# Patient Record
Sex: FEMALE | Race: WHITE
[De-identification: names, ages, dates, MRNs, and addresses within clinical notes are randomized per-mention and may not be internally consistent; named-entity substitution may affect disease eponyms.]

---

## 2017-02-09 ENCOUNTER — HOSPITAL ENCOUNTER (EMERGENCY)
Dept: HOSPITAL 47 - EC | Age: 42
Discharge: HOME | End: 2017-02-09
Payer: MEDICAID

## 2017-02-09 VITALS — RESPIRATION RATE: 16 BRPM

## 2017-02-09 VITALS — HEART RATE: 69 BPM | SYSTOLIC BLOOD PRESSURE: 115 MMHG | DIASTOLIC BLOOD PRESSURE: 70 MMHG | TEMPERATURE: 97.5 F

## 2017-02-09 DIAGNOSIS — M25.511: ICD-10-CM

## 2017-02-09 DIAGNOSIS — R07.9: Primary | ICD-10-CM

## 2017-02-09 DIAGNOSIS — F17.200: ICD-10-CM

## 2017-02-09 DIAGNOSIS — R53.1: ICD-10-CM

## 2017-02-09 DIAGNOSIS — R00.1: ICD-10-CM

## 2017-02-09 DIAGNOSIS — Z79.899: ICD-10-CM

## 2017-02-09 DIAGNOSIS — Z87.01: ICD-10-CM

## 2017-02-09 LAB
ALP SERPL-CCNC: 69 U/L (ref 38–126)
ALT SERPL-CCNC: 38 U/L (ref 9–52)
ANION GAP SERPL CALC-SCNC: 10 MMOL/L
APTT BLD: 24.5 SEC (ref 22–30)
AST SERPL-CCNC: 20 U/L (ref 14–36)
BASOPHILS # BLD AUTO: 0.1 K/UL (ref 0–0.2)
BASOPHILS NFR BLD AUTO: 1 %
BUN SERPL-SCNC: 17 MG/DL (ref 7–17)
CALCIUM SPEC-MCNC: 9.6 MG/DL (ref 8.4–10.2)
CH: 30.1
CHCM: 33.8
CHLORIDE SERPL-SCNC: 105 MMOL/L (ref 98–107)
CK SERPL-CCNC: 58 U/L (ref 30–135)
CO2 SERPL-SCNC: 27 MMOL/L (ref 22–30)
EOSINOPHIL # BLD AUTO: 0.3 K/UL (ref 0–0.7)
EOSINOPHIL NFR BLD AUTO: 4 %
ERYTHROCYTE [DISTWIDTH] IN BLOOD BY AUTOMATED COUNT: 4.55 M/UL (ref 3.8–5.4)
ERYTHROCYTE [DISTWIDTH] IN BLOOD: 13 % (ref 11.5–15.5)
GLUCOSE SERPL-MCNC: 104 MG/DL (ref 74–99)
HCT VFR BLD AUTO: 40.7 % (ref 34–46)
HDW: 2.43
HGB BLD-MCNC: 13.7 GM/DL (ref 11.4–16)
INR PPP: 1 (ref ?–1.1)
LUC NFR BLD AUTO: 4 %
LYMPHOCYTES # SPEC AUTO: 2.8 K/UL (ref 1–4.8)
LYMPHOCYTES NFR SPEC AUTO: 35 %
MAGNESIUM SPEC-SCNC: 1.7 MG/DL (ref 1.6–2.3)
MCH RBC QN AUTO: 30 PG (ref 25–35)
MCHC RBC AUTO-ENTMCNC: 33.5 G/DL (ref 31–37)
MCV RBC AUTO: 89.5 FL (ref 80–100)
MONOCYTES # BLD AUTO: 0.4 K/UL (ref 0–1)
MONOCYTES NFR BLD AUTO: 5 %
NEUTROPHILS # BLD AUTO: 4.2 K/UL (ref 1.3–7.7)
NEUTROPHILS NFR BLD AUTO: 51 %
NON-AFRICAN AMERICAN GFR(MDRD): >60
PHOSPHATE SERPL-MCNC: 4.4 MG/DL (ref 2.5–4.5)
POTASSIUM SERPL-SCNC: 3.8 MMOL/L (ref 3.5–5.1)
PROT SERPL-MCNC: 7.1 G/DL (ref 6.3–8.2)
PT BLD: 10.3 SEC (ref 9–12)
SODIUM SERPL-SCNC: 142 MMOL/L (ref 137–145)
TROPONIN I SERPL-MCNC: <0.012 NG/ML (ref 0–0.03)
WBC # BLD AUTO: 0.34 10*3/UL
WBC # BLD AUTO: 8.1 K/UL (ref 3.8–10.6)
WBC (PEROX): 8.37

## 2017-02-09 PROCEDURE — 83735 ASSAY OF MAGNESIUM: CPT

## 2017-02-09 PROCEDURE — 85730 THROMBOPLASTIN TIME PARTIAL: CPT

## 2017-02-09 PROCEDURE — 96374 THER/PROPH/DIAG INJ IV PUSH: CPT

## 2017-02-09 PROCEDURE — 85610 PROTHROMBIN TIME: CPT

## 2017-02-09 PROCEDURE — 82550 ASSAY OF CK (CPK): CPT

## 2017-02-09 PROCEDURE — 99285 EMERGENCY DEPT VISIT HI MDM: CPT

## 2017-02-09 PROCEDURE — 84484 ASSAY OF TROPONIN QUANT: CPT

## 2017-02-09 PROCEDURE — 84443 ASSAY THYROID STIM HORMONE: CPT

## 2017-02-09 PROCEDURE — 36415 COLL VENOUS BLD VENIPUNCTURE: CPT

## 2017-02-09 PROCEDURE — 84100 ASSAY OF PHOSPHORUS: CPT

## 2017-02-09 PROCEDURE — 96361 HYDRATE IV INFUSION ADD-ON: CPT

## 2017-02-09 PROCEDURE — 85025 COMPLETE CBC W/AUTO DIFF WBC: CPT

## 2017-02-09 PROCEDURE — 85379 FIBRIN DEGRADATION QUANT: CPT

## 2017-02-09 PROCEDURE — 87502 INFLUENZA DNA AMP PROBE: CPT

## 2017-02-09 PROCEDURE — 83690 ASSAY OF LIPASE: CPT

## 2017-02-09 PROCEDURE — 80053 COMPREHEN METABOLIC PANEL: CPT

## 2017-02-09 PROCEDURE — 80320 DRUG SCREEN QUANTALCOHOLS: CPT

## 2017-02-09 PROCEDURE — 93005 ELECTROCARDIOGRAM TRACING: CPT

## 2017-02-09 PROCEDURE — 82553 CREATINE MB FRACTION: CPT

## 2017-02-09 PROCEDURE — 71020: CPT

## 2017-02-09 NOTE — XR
EXAMINATION TYPE: XR chest 2V

 

DATE OF EXAM: 2/9/2017 10:23 PM

 

COMPARISON: NONE

 

HISTORY: Right-sided chest pain

 

TECHNIQUE:  Frontal and lateral views of the chest are obtained.

 

FINDINGS:  Heart and mediastinum are normal. Lungs are clear. Diaphragm is normal. Bony thorax is int
act. There are chest leads.

 

IMPRESSION:  Normal chest

## 2017-02-09 NOTE — ED
General Adult HPI





- General


Chief complaint: Chest Pain


Stated complaint: Chest Pain


Time Seen by Provider: 02/09/17 21:10


Source: patient, RN notes reviewed, old records reviewed


Mode of arrival: ambulatory


Limitations: no limitations





- History of Present Illness


Initial comments: 





This is a 41-year-old female ER for evaluation in general not feeling well, 

patient also states she noticed her pulse was low.  Patient has no significant 

medical history unremarkable pressure-like cholesterol no diabetes occasional 

smoker.  Patient has no prior history of chronic ER for any issues.  Patient 

does note that she has maybe a valve issue.  Patient states she still does not 

feel well, denies any chest pain or short of breath, does admit to some right 

shoulder and right pectoral pain with deep breath.  It or moving her right arm.

  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  No fevers, 

she did travel [place] in December.  No pain or swelling of lower extremities





- Related Data


 Home Medications











 Medication  Instructions  Recorded  Confirmed


 


Dextroamphetamine/Amphetamine 10 mg PO DAILY 02/09/17 02/09/17





[Adderall]   











 Allergies











Allergy/AdvReac Type Severity Reaction Status Date / Time


 


No Known Allergies Allergy   Verified 02/09/17 21:16














Review of Systems


ROS Statement: 


Those systems with pertinent positive or pertinent negative responses have been 

documented in the HPI.





ROS Other: All systems not noted in ROS Statement are negative.





Past Medical History


Past Medical History: Pneumonia


History of Any Multi-Drug Resistant Organisms: None Reported


Past Surgical History: Appendectomy


Past Psychological History: No Psychological Hx Reported


Smoking Status: Current every day smoker


Past Alcohol Use History: Occasional


Past Drug Use History: Marijuana





General Exam


Limitations: no limitations


General appearance: alert, in no apparent distress, anxious


Head exam: Present: atraumatic, normocephalic, normal inspection


Eye exam: Present: normal appearance, PERRL, EOMI.  Absent: scleral icterus, 

conjunctival injection, periorbital swelling


ENT exam: Present: normal exam, mucous membranes moist


Neck exam: Present: normal inspection.  Absent: tenderness, meningismus, 

lymphadenopathy


Respiratory exam: Present: normal lung sounds bilaterally.  Absent: respiratory 

distress, wheezes, rales, rhonchi, stridor


Cardiovascular Exam: Present: regular rate, normal rhythm, normal heart sounds.

  Absent: systolic murmur, diastolic murmur, rubs, gallop, clicks


GI/Abdominal exam: Present: soft, normal bowel sounds.  Absent: distended, 

tenderness, guarding, rebound, rigid


Extremities exam: Present: normal inspection, full ROM, normal capillary 

refill.  Absent: tenderness, pedal edema, joint swelling, calf tenderness


Back exam: Present: normal inspection


Neurological exam: Present: alert, oriented X3, CN II-XII intact


Psychiatric exam: Present: normal affect, normal mood


Skin exam: Present: warm, dry, intact, normal color.  Absent: rash





Course


 Vital Signs











  02/09/17 02/09/17





  20:55 22:17


 


Temperature 98.8 F 97.8 F


 


Pulse Rate 44 L 70


 


Respiratory 18 16





Rate  


 


Blood Pressure 150/81 110/74


 


O2 Sat by Pulse 96 96





Oximetry  














EKG Findings





- EKG Comments:


EKG Findings:: EKG shows normal sinus rhythm rate of 68, RI 18, QRS 76, 





Medical Decision Making





- Medical Decision Making





41 female here with nonspecific symptoms, not feeling well, anxiety, patient 

did have a lower pulse but no episodes of syncope no passing out, patient's 

pulse was normal here in the emergency room, EKG is normal lab work is normal 

patient can be discharged





- Lab Data


Result diagrams: 


 02/09/17 21:36





 02/09/17 21:36


 Lab Results











  02/09/17 02/09/17 02/09/17 Range/Units





  21:36 21:36 21:36 


 


WBC   8.1   (3.8-10.6)  k/uL


 


RBC   4.55   (3.80-5.40)  m/uL


 


Hgb   13.7   (11.4-16.0)  gm/dL


 


Hct   40.7   (34.0-46.0)  %


 


MCV   89.5   (80.0-100.0)  fL


 


MCH   30.0   (25.0-35.0)  pg


 


MCHC   33.5   (31.0-37.0)  g/dL


 


RDW   13.0   (11.5-15.5)  %


 


Plt Count   234   (150-450)  k/uL


 


Neutrophils %   51   %


 


Lymphocytes %   35   %


 


Monocytes %   5   %


 


Eosinophils %   4   %


 


Basophils %   1   %


 


Neutrophils #   4.2   (1.3-7.7)  k/uL


 


Lymphocytes #   2.8   (1.0-4.8)  k/uL


 


Monocytes #   0.4   (0-1.0)  k/uL


 


Eosinophils #   0.3   (0-0.7)  k/uL


 


Basophils #   0.1   (0-0.2)  k/uL


 


PT     (9.0-12.0)  sec


 


INR     (<1.1)  


 


APTT     (22.0-30.0)  sec


 


D-Dimer     (<0.60)  mg/L FEU


 


Sodium    142  (137-145)  mmol/L


 


Potassium    3.8  (3.5-5.1)  mmol/L


 


Chloride    105  ()  mmol/L


 


Carbon Dioxide    27  (22-30)  mmol/L


 


Anion Gap    10  mmol/L


 


BUN    17  (7-17)  mg/dL


 


Creatinine    0.67  (0.52-1.04)  mg/dL


 


Est GFR (MDRD) Af Amer    >60  (>60 ml/min/1.73 sqM)  


 


Est GFR (MDRD) Non-Af    >60  (>60 ml/min/1.73 sqM)  


 


Glucose    104 H  (74-99)  mg/dL


 


Calcium    9.6  (8.4-10.2)  mg/dL


 


Phosphorus    4.4  (2.5-4.5)  mg/dL


 


Magnesium    1.7  (1.6-2.3)  mg/dL


 


Total Bilirubin    0.5  (0.2-1.3)  mg/dL


 


AST    20  (14-36)  U/L


 


ALT    38  (9-52)  U/L


 


Alkaline Phosphatase    69  ()  U/L


 


Total Creatine Kinase  58    ()  U/L


 


CK-MB (CK-2)  0.7    (0.0-2.4)  ng/mL


 


CK-MB (CK-2) Rel Index  1.2    


 


Troponin I  <0.012    (0.000-0.034)  ng/mL


 


Total Protein    7.1  (6.3-8.2)  g/dL


 


Albumin    4.3  (3.5-5.0)  g/dL


 


Lipase    95  ()  U/L


 


TSH    3.790  (0.465-4.680)  mIU/L


 


Serum Alcohol    <10  mg/dL


 


Influenza Type A RNA     (Not Detectd)  


 


Influenza Type B (PCR)     (Not Detectd)  














  02/09/17 02/09/17 Range/Units





  21:36 21:36 


 


WBC    (3.8-10.6)  k/uL


 


RBC    (3.80-5.40)  m/uL


 


Hgb    (11.4-16.0)  gm/dL


 


Hct    (34.0-46.0)  %


 


MCV    (80.0-100.0)  fL


 


MCH    (25.0-35.0)  pg


 


MCHC    (31.0-37.0)  g/dL


 


RDW    (11.5-15.5)  %


 


Plt Count    (150-450)  k/uL


 


Neutrophils %    %


 


Lymphocytes %    %


 


Monocytes %    %


 


Eosinophils %    %


 


Basophils %    %


 


Neutrophils #    (1.3-7.7)  k/uL


 


Lymphocytes #    (1.0-4.8)  k/uL


 


Monocytes #    (0-1.0)  k/uL


 


Eosinophils #    (0-0.7)  k/uL


 


Basophils #    (0-0.2)  k/uL


 


PT  10.3   (9.0-12.0)  sec


 


INR  1.0   (<1.1)  


 


APTT  24.5   (22.0-30.0)  sec


 


D-Dimer  0.26   (<0.60)  mg/L FEU


 


Sodium    (137-145)  mmol/L


 


Potassium    (3.5-5.1)  mmol/L


 


Chloride    ()  mmol/L


 


Carbon Dioxide    (22-30)  mmol/L


 


Anion Gap    mmol/L


 


BUN    (7-17)  mg/dL


 


Creatinine    (0.52-1.04)  mg/dL


 


Est GFR (MDRD) Af Amer    (>60 ml/min/1.73 sqM)  


 


Est GFR (MDRD) Non-Af    (>60 ml/min/1.73 sqM)  


 


Glucose    (74-99)  mg/dL


 


Calcium    (8.4-10.2)  mg/dL


 


Phosphorus    (2.5-4.5)  mg/dL


 


Magnesium    (1.6-2.3)  mg/dL


 


Total Bilirubin    (0.2-1.3)  mg/dL


 


AST    (14-36)  U/L


 


ALT    (9-52)  U/L


 


Alkaline Phosphatase    ()  U/L


 


Total Creatine Kinase    ()  U/L


 


CK-MB (CK-2)    (0.0-2.4)  ng/mL


 


CK-MB (CK-2) Rel Index    


 


Troponin I    (0.000-0.034)  ng/mL


 


Total Protein    (6.3-8.2)  g/dL


 


Albumin    (3.5-5.0)  g/dL


 


Lipase    ()  U/L


 


TSH    (0.465-4.680)  mIU/L


 


Serum Alcohol    mg/dL


 


Influenza Type A RNA   Not Detected  (Not Detectd)  


 


Influenza Type B (PCR)   Not Detected  (Not Detectd)  














- Radiology Data


Radiology results: report reviewed (Chest x-ray two-view is negative for acute 

disease), image reviewed





Disposition


Clinical Impression: 


 Chest pain, Weakness, Bradycardia





Disposition: HOME SELF-CARE


Condition: Good


Instructions:  Bradycardia (ED)


Referrals: 


Stephanie Correia MD [Primary Care Provider] - 1-2 days

## 2018-02-01 ENCOUNTER — HOSPITAL ENCOUNTER (OUTPATIENT)
Dept: HOSPITAL 47 - MMGSC | Age: 43
Discharge: HOME | End: 2018-02-01
Payer: COMMERCIAL

## 2018-02-01 DIAGNOSIS — R63.5: ICD-10-CM

## 2018-02-01 DIAGNOSIS — Z00.00: Primary | ICD-10-CM

## 2018-02-01 LAB
ALBUMIN SERPL-MCNC: 3.9 G/DL (ref 3.5–5)
ALP SERPL-CCNC: 68 U/L (ref 38–126)
ALT SERPL-CCNC: 32 U/L (ref 9–52)
ANION GAP SERPL CALC-SCNC: 9 MMOL/L
AST SERPL-CCNC: 26 U/L (ref 14–36)
BUN SERPL-SCNC: 18 MG/DL (ref 7–17)
CALCIUM SPEC-MCNC: 9.4 MG/DL (ref 8.4–10.2)
CHLORIDE SERPL-SCNC: 104 MMOL/L (ref 98–107)
CHOLEST SERPL-MCNC: 155 MG/DL (ref ?–200)
CO2 SERPL-SCNC: 28 MMOL/L (ref 22–30)
GLUCOSE SERPL-MCNC: 104 MG/DL (ref 74–99)
HDLC SERPL-MCNC: 60 MG/DL (ref 40–60)
LDLC SERPL CALC-MCNC: 77 MG/DL (ref 0–99)
POTASSIUM SERPL-SCNC: 4.2 MMOL/L (ref 3.5–5.1)
PROT SERPL-MCNC: 6.7 G/DL (ref 6.3–8.2)
SODIUM SERPL-SCNC: 141 MMOL/L (ref 137–145)
T4 FREE SERPL-MCNC: 1.31 NG/DL (ref 0.78–2.19)
TRIGL SERPL-MCNC: 88 MG/DL (ref ?–150)

## 2018-02-01 PROCEDURE — 80053 COMPREHEN METABOLIC PANEL: CPT

## 2018-02-01 PROCEDURE — 84439 ASSAY OF FREE THYROXINE: CPT

## 2018-02-01 PROCEDURE — 36415 COLL VENOUS BLD VENIPUNCTURE: CPT

## 2018-02-01 PROCEDURE — 84443 ASSAY THYROID STIM HORMONE: CPT

## 2018-02-01 PROCEDURE — 80061 LIPID PANEL: CPT

## 2018-09-28 ENCOUNTER — HOSPITAL ENCOUNTER (OUTPATIENT)
Dept: HOSPITAL 47 - RADUSWWP | Age: 43
End: 2018-09-28
Attending: FAMILY MEDICINE
Payer: COMMERCIAL

## 2018-09-28 DIAGNOSIS — R10.84: ICD-10-CM

## 2018-09-28 DIAGNOSIS — Z12.31: Primary | ICD-10-CM

## 2018-09-28 DIAGNOSIS — D25.9: ICD-10-CM

## 2018-09-28 PROCEDURE — 76856 US EXAM PELVIC COMPLETE: CPT

## 2018-09-28 PROCEDURE — 77067 SCR MAMMO BI INCL CAD: CPT

## 2018-09-28 PROCEDURE — 76700 US EXAM ABDOM COMPLETE: CPT

## 2018-09-28 NOTE — US
EXAMINATION TYPE: US pelvic complete

 

DATE OF EXAM: 9/28/2018

 

COMPARISON: US 11/18/2013

 

CLINICAL HISTORY: R10.84 ABDOMINAL PAIN. Pt states ABD and RLQ pain

 

TECHNIQUE:  Transabdominal (TA).  Transabdominal sonographic images of the pelvis were acquired. 

 

Date of LMP:  Pt states 6 years ago, G 0, P 0

 

EXAM MEASUREMENTS:

 

Uterus:  5.5 x 2.5 x 3.1 cm

Endometrial Stripe: 0.5 cm

Right Ovary:  1.7 x 0.9 x 1.3 cm

Left Ovary:  2.1 x 1.3 x 1.7 cm

 

 

 

1. Uterus:  Anteverted   Stable fibroid as seen on previous= 2.7 x 2.3 x 2.6 cm

2. Endometrium:  wnl

3. Right Ovary:  wnl

4. Left Ovary:  wnl

5. Bilateral Adnexa:  wnl

6. Posterior cul-de-sac:  wnl

 

 

 

IMPRESSION: 

1. Uterine fibroid, stable from comparison

## 2018-09-28 NOTE — MM
Reason for exam: screening  (asymptomatic).

Baseline mammogram.



History:

Patient is postmenopausal.



Physical Findings:

Nurse did not find any significant physical abnormalities on exam.



MG Screening Mammo w CAD

Bilateral CC and MLO view(s) were taken.

The breast tissue is extremely dense which could obscure a lesion on mammography. 

There is no discrete abnormality.



These results were verbally communicated with the patient and result sheet given 

to the patient on 9/28/18.





ASSESSMENT: Benign, BI-RAD 2



RECOMMENDATION:

Routine screening mammogram of both breasts in 1 year.

## 2018-09-28 NOTE — US
EXAMINATION TYPE: US abdomen complete

 

DATE OF EXAM: 9/28/2018

 

COMPARISON: NONE

 

CLINICAL HISTORY: R94.5 Abnormal Liver function. Abn LFT's, generalized ABD pain

 

EXAM MEASUREMENTS:

 

Liver Length:  14.2 cm   

Gallbladder Wall:  0.2 cm   

CBD:  0.4 cm

Spleen:  9.2 cm   

Right Kidney:  12.7 x 6.0 x 5.6 cm 

Left Kidney:  11.1 x 6.5 x 4.7 cm   

 

 

 

Pancreas:  Obscured by bowel gas

Liver:  wnl  

Gallbladder:  wnl

**Evidence for sonographic Crouch's sign:  No

CBD:  wnl 

Spleen:  wnl   

Right Kidney:  wnl   

Left Kidney:  wnl   

Upper IVC:  wnl  

Abd Aorta:  wnl

 

IMPRESSION: 

1. Normal abdomen ultrasound

## 2019-07-06 ENCOUNTER — HOSPITAL ENCOUNTER (EMERGENCY)
Dept: HOSPITAL 47 - EC | Age: 44
Discharge: HOME | End: 2019-07-06
Payer: COMMERCIAL

## 2019-07-06 VITALS
HEART RATE: 76 BPM | SYSTOLIC BLOOD PRESSURE: 136 MMHG | DIASTOLIC BLOOD PRESSURE: 82 MMHG | RESPIRATION RATE: 16 BRPM | TEMPERATURE: 97.9 F

## 2019-07-06 DIAGNOSIS — R31.9: ICD-10-CM

## 2019-07-06 DIAGNOSIS — Z87.19: ICD-10-CM

## 2019-07-06 DIAGNOSIS — Z90.49: ICD-10-CM

## 2019-07-06 DIAGNOSIS — K57.92: Primary | ICD-10-CM

## 2019-07-06 DIAGNOSIS — F17.200: ICD-10-CM

## 2019-07-06 LAB
ALBUMIN SERPL-MCNC: 4.4 G/DL (ref 3.5–5)
ALP SERPL-CCNC: 73 U/L (ref 38–126)
ALT SERPL-CCNC: 18 U/L (ref 9–52)
ANION GAP SERPL CALC-SCNC: 9 MMOL/L
AST SERPL-CCNC: 14 U/L (ref 14–36)
BASOPHILS # BLD AUTO: 0.1 K/UL (ref 0–0.2)
BASOPHILS NFR BLD AUTO: 1 %
BUN SERPL-SCNC: 17 MG/DL (ref 7–17)
CALCIUM SPEC-MCNC: 9 MG/DL (ref 8.4–10.2)
CHLORIDE SERPL-SCNC: 103 MMOL/L (ref 98–107)
CO2 SERPL-SCNC: 26 MMOL/L (ref 22–30)
EOSINOPHIL # BLD AUTO: 0.2 K/UL (ref 0–0.7)
EOSINOPHIL NFR BLD AUTO: 2 %
ERYTHROCYTE [DISTWIDTH] IN BLOOD BY AUTOMATED COUNT: 4.36 M/UL (ref 3.8–5.4)
ERYTHROCYTE [DISTWIDTH] IN BLOOD: 14.3 % (ref 11.5–15.5)
GLUCOSE SERPL-MCNC: 101 MG/DL (ref 74–99)
HCT VFR BLD AUTO: 38.3 % (ref 34–46)
HGB BLD-MCNC: 12.7 GM/DL (ref 11.4–16)
LYMPHOCYTES # SPEC AUTO: 2.6 K/UL (ref 1–4.8)
LYMPHOCYTES NFR SPEC AUTO: 26 %
MCH RBC QN AUTO: 29.2 PG (ref 25–35)
MCHC RBC AUTO-ENTMCNC: 33.2 G/DL (ref 31–37)
MCV RBC AUTO: 88 FL (ref 80–100)
MONOCYTES # BLD AUTO: 0.5 K/UL (ref 0–1)
MONOCYTES NFR BLD AUTO: 5 %
NEUTROPHILS # BLD AUTO: 6.3 K/UL (ref 1.3–7.7)
NEUTROPHILS NFR BLD AUTO: 64 %
PH UR: 5.5 [PH] (ref 5–8)
PLATELET # BLD AUTO: 228 K/UL (ref 150–450)
POTASSIUM SERPL-SCNC: 3.8 MMOL/L (ref 3.5–5.1)
PROT SERPL-MCNC: 7.1 G/DL (ref 6.3–8.2)
RBC UR QL: 2 /HPF (ref 0–5)
SODIUM SERPL-SCNC: 138 MMOL/L (ref 137–145)
SP GR UR: 1.02 (ref 1–1.03)
SQUAMOUS UR QL AUTO: <1 /HPF (ref 0–4)
UROBILINOGEN UR QL STRIP: <2 MG/DL (ref ?–2)
WBC # BLD AUTO: 9.9 K/UL (ref 3.8–10.6)
WBC #/AREA URNS HPF: 1 /HPF (ref 0–5)

## 2019-07-06 PROCEDURE — 85025 COMPLETE CBC W/AUTO DIFF WBC: CPT

## 2019-07-06 PROCEDURE — 36415 COLL VENOUS BLD VENIPUNCTURE: CPT

## 2019-07-06 PROCEDURE — 81001 URINALYSIS AUTO W/SCOPE: CPT

## 2019-07-06 PROCEDURE — 80053 COMPREHEN METABOLIC PANEL: CPT

## 2019-07-06 PROCEDURE — 74176 CT ABD & PELVIS W/O CONTRAST: CPT

## 2019-07-06 PROCEDURE — 81025 URINE PREGNANCY TEST: CPT

## 2019-07-06 PROCEDURE — 99284 EMERGENCY DEPT VISIT MOD MDM: CPT

## 2019-07-06 NOTE — ED
Abdominal Pain HPI





- General


Chief Complaint: Abdominal Pain


Stated Complaint: LLQ PAIN


Time Seen by Provider: 07/06/19 17:32


Source: patient


Mode of arrival: ambulatory


Limitations: no limitations





- History of Present Illness


Initial Comments: 


43-year-old female presenting for left lower quadrant abdominal pain.  Patient 

states she was supplemented express because she had blood in her urine.  Patient

provided prescription for ciprofloxacin and she has history of diverticulitis.  

Patient states it feels similar to when she had diverticulitis in the past.  

Patient states she is comfortable when sitting there however when she touches 

the area is tender to palpation.  Patient denies fevers.  She has vomiting or 

diarrhea.  She denies melena or hematochezia.  Remaining review of systems 

negative upon arrival patient appears well no signs of acute distress.








- Related Data


                                  Previous Rx's











 Medication  Instructions  Recorded


 


Amoxic-Pot Clav 875-125Mg 1 tab PO Q12HR 10 Days #20 tablet 07/06/19





[Augmentin 875-125]  











                                    Allergies











Allergy/AdvReac Type Severity Reaction Status Date / Time


 


No Known Allergies Allergy   Verified 07/06/19 18:24














Review of Systems


ROS Statement: 


Those systems with pertinent positive or pertinent negative responses have been 

documented in the HPI.





ROS Other: All systems not noted in ROS Statement are negative.





Past Medical History


Past Medical History: Pneumonia


Additional Past Medical History / Comment(s): diverticulosis, bradycardia


History of Any Multi-Drug Resistant Organisms: None Reported


Past Surgical History: Appendectomy


Past Psychological History: No Psychological Hx Reported


Smoking Status: Current every day smoker


Past Alcohol Use History: Occasional


Past Drug Use History: Marijuana





General Exam





- General Exam Comments


Initial Comments: 


General:  The patient is awake and alert, in no distress, and does not appear 

acutely ill. 


Eye:  Pupils are equal, round and reactive to light, extra-ocular movements are 

intact.  No nystagmus.  There is normal conjunctiva bilaterally.  No signs of 

icterus.  


Ears, nose, mouth and throat:  There are moist mucous membranes and no oral 

lesions. 


Neck:  The neck is supple, there is no tenderness or JVD.  


Cardiovascular:  There is a regular rate and rhythm. No murmur, rub or gallop is

appreciated.


Respiratory:  Lungs are clear to auscultation, respirations are non-labored, 

breath sounds are equal.  No wheezes, stridor, rales, or rhonchi.


Gastrointestinal:  Soft, non-distended, abdomen tender to palpation of the left 

lower quadrant without masses or organomegaly noted. There is no rebound or 

guarding present.  No CVA tenderness. Bowel sounds are unremarkable.


Musculoskeletal:  Normal ROM, no tenderness.  Strength 5/5. Sensation intact. 

Pulses equal bilaterally 2+.  


Neurological:  A&O x 3. CN II-XII intact, There are no obvious motor or sensory 

deficits. Coordination appears grossly intact. Speech is normal.


Skin:  Skin is warm and dry and no rashes or lesions are noted. 


Psychiatric:  Cooperative, appropriate mood & affect, normal judgment.  





Limitations: no limitations





Course


                                   Vital Signs











  07/06/19 07/06/19





  17:24 19:51


 


Temperature 98.7 F 97.9 F


 


Pulse Rate 73 76


 


Respiratory 18 16





Rate  


 


Blood Pressure 121/81 136/82


 


O2 Sat by Pulse 100 98





Oximetry  














Medical Decision Making





- Medical Decision Making





CT revealed findings of an uncomplicated diverticulitis.  No evidence of 

perforation.  No absence of abscess or phlegm and.  Patient is to take Augmentin

twice daily for the next 14 days instead of ciprofloxacin given the risk of 

tendon rupture and black box warning I feel Augmentin is a safer alternative.  

Patient does not appear peritoneal.  Patient states her pain is controlled.  

Patient afebrile.  No leukocytosis.  I did recommend patient have a endoscopy 

and she has not had one since the diagnosis of diverticulitis/ecchymosis.  

Patient is group And she prefers discharge at this time.  Return parameters as 

well as risk perforation were discussed with patient who verbalized 

understanding.  Patient was discharged.  Water discussed the case with attending

provider Dr. Sanchez/





- Lab Data


Result diagrams: 


                                 07/06/19 17:52





                                 07/06/19 17:52


                                   Lab Results











  07/06/19 07/06/19 07/06/19 Range/Units





  17:52 17:52 17:52 


 


WBC  9.9    (3.8-10.6)  k/uL


 


RBC  4.36    (3.80-5.40)  m/uL


 


Hgb  12.7    (11.4-16.0)  gm/dL


 


Hct  38.3    (34.0-46.0)  %


 


MCV  88.0    (80.0-100.0)  fL


 


MCH  29.2    (25.0-35.0)  pg


 


MCHC  33.2    (31.0-37.0)  g/dL


 


RDW  14.3    (11.5-15.5)  %


 


Plt Count  228    (150-450)  k/uL


 


Neutrophils %  64    %


 


Lymphocytes %  26    %


 


Monocytes %  5    %


 


Eosinophils %  2    %


 


Basophils %  1    %


 


Neutrophils #  6.3    (1.3-7.7)  k/uL


 


Lymphocytes #  2.6    (1.0-4.8)  k/uL


 


Monocytes #  0.5    (0-1.0)  k/uL


 


Eosinophils #  0.2    (0-0.7)  k/uL


 


Basophils #  0.1    (0-0.2)  k/uL


 


Sodium   138   (137-145)  mmol/L


 


Potassium   3.8   (3.5-5.1)  mmol/L


 


Chloride   103   ()  mmol/L


 


Carbon Dioxide   26   (22-30)  mmol/L


 


Anion Gap   9   mmol/L


 


BUN   17   (7-17)  mg/dL


 


Creatinine   0.75   (0.52-1.04)  mg/dL


 


Est GFR (CKD-EPI)AfAm   >90   (>60 ml/min/1.73 sqM)  


 


Est GFR (CKD-EPI)NonAf   >90   (>60 ml/min/1.73 sqM)  


 


Glucose   101 H   (74-99)  mg/dL


 


Calcium   9.0   (8.4-10.2)  mg/dL


 


Total Bilirubin   0.7   (0.2-1.3)  mg/dL


 


AST   14   (14-36)  U/L


 


ALT   18   (9-52)  U/L


 


Alkaline Phosphatase   73   ()  U/L


 


Total Protein   7.1   (6.3-8.2)  g/dL


 


Albumin   4.4   (3.5-5.0)  g/dL


 


Urine Color     


 


Urine Appearance     (Clear)  


 


Urine pH     (5.0-8.0)  


 


Ur Specific Gravity     (1.001-1.035)  


 


Urine Protein     (Negative)  


 


Urine Glucose (UA)     (Negative)  


 


Urine Ketones     (Negative)  


 


Urine Blood     (Negative)  


 


Urine Nitrite     (Negative)  


 


Urine Bilirubin     (Negative)  


 


Urine Urobilinogen     (<2.0)  mg/dL


 


Ur Leukocyte Esterase     (Negative)  


 


Urine RBC     (0-5)  /hpf


 


Urine WBC     (0-5)  /hpf


 


Ur Squamous Epith Cells     (0-4)  /hpf


 


Urine Bacteria     (None)  /hpf


 


Urine Mucus     (None)  /hpf


 


Urine HCG, Qual    Not Detected  (Not Detectd)  














  07/06/19 Range/Units





  17:52 


 


WBC   (3.8-10.6)  k/uL


 


RBC   (3.80-5.40)  m/uL


 


Hgb   (11.4-16.0)  gm/dL


 


Hct   (34.0-46.0)  %


 


MCV   (80.0-100.0)  fL


 


MCH   (25.0-35.0)  pg


 


MCHC   (31.0-37.0)  g/dL


 


RDW   (11.5-15.5)  %


 


Plt Count   (150-450)  k/uL


 


Neutrophils %   %


 


Lymphocytes %   %


 


Monocytes %   %


 


Eosinophils %   %


 


Basophils %   %


 


Neutrophils #   (1.3-7.7)  k/uL


 


Lymphocytes #   (1.0-4.8)  k/uL


 


Monocytes #   (0-1.0)  k/uL


 


Eosinophils #   (0-0.7)  k/uL


 


Basophils #   (0-0.2)  k/uL


 


Sodium   (137-145)  mmol/L


 


Potassium   (3.5-5.1)  mmol/L


 


Chloride   ()  mmol/L


 


Carbon Dioxide   (22-30)  mmol/L


 


Anion Gap   mmol/L


 


BUN   (7-17)  mg/dL


 


Creatinine   (0.52-1.04)  mg/dL


 


Est GFR (CKD-EPI)AfAm   (>60 ml/min/1.73 sqM)  


 


Est GFR (CKD-EPI)NonAf   (>60 ml/min/1.73 sqM)  


 


Glucose   (74-99)  mg/dL


 


Calcium   (8.4-10.2)  mg/dL


 


Total Bilirubin   (0.2-1.3)  mg/dL


 


AST   (14-36)  U/L


 


ALT   (9-52)  U/L


 


Alkaline Phosphatase   ()  U/L


 


Total Protein   (6.3-8.2)  g/dL


 


Albumin   (3.5-5.0)  g/dL


 


Urine Color  Yellow  


 


Urine Appearance  Clear  (Clear)  


 


Urine pH  5.5  (5.0-8.0)  


 


Ur Specific Gravity  1.017  (1.001-1.035)  


 


Urine Protein  Negative  (Negative)  


 


Urine Glucose (UA)  Negative  (Negative)  


 


Urine Ketones  Negative  (Negative)  


 


Urine Blood  Small H  (Negative)  


 


Urine Nitrite  Negative  (Negative)  


 


Urine Bilirubin  Negative  (Negative)  


 


Urine Urobilinogen  <2.0  (<2.0)  mg/dL


 


Ur Leukocyte Esterase  Negative  (Negative)  


 


Urine RBC  2  (0-5)  /hpf


 


Urine WBC  1  (0-5)  /hpf


 


Ur Squamous Epith Cells  <1  (0-4)  /hpf


 


Urine Bacteria  Rare H  (None)  /hpf


 


Urine Mucus  Rare H  (None)  /hpf


 


Urine HCG, Qual   (Not Detectd)  














Disposition


Clinical Impression: 


 Diverticulitis, Abdominal pain





Disposition: HOME SELF-CARE


Condition: Good


Instructions (If sedation given, give patient instructions):  Diverticulitis 

(ED)


Additional Instructions: 


Please use medication as discussed.  Please follow-up with family doctor in the 

next 2 days. I recommend outpatient colonoscopy when treatment completed/symptom

free. Please return to emergency room if the symptoms increase or worsen or for 

any other concerns.


Prescriptions: 


Amoxic-Pot Clav 875-125Mg [Augmentin 875-125] 1 tab PO Q12HR 10 Days #20 tablet


Is patient prescribed a controlled substance at d/c from ED?: No


Referrals: 


Stephanie Correia MD [Primary Care Provider] - 1-2 days


Time of Disposition: 19:31

## 2019-07-06 NOTE — CT
EXAMINATION TYPE: CT abdomen pelvis wo con

 

DATE OF EXAM: 7/6/2019

 

COMPARISON: None

 

HISTORY: Left side abdominal pain

 

CT DLP: 636.1 mGycm

Automated exposure control for dose reduction was used.

 

TECHNIQUE:  Helical acquisition of images was performed from the lung bases through the pelvis.

 

FINDINGS: 

 

Lung bases are clear. There is no pleural effusion. Heart size is normal.

 

Liver spleen stomach pancreas appear normal. Bile ducts are not dilated. Gallbladder appears normal.

 

There is no adrenal mass. Kidneys have normal size. There is no hydronephrosis. Ureters are not dilat
ed.

 

There is fat stranding around the descending colon with multiple diverticula.

 

Bladder distends smoothly. There is no inguinal hernia. There is no free fluid in the pelvis. There a
re clips in the right lower quadrant probably from appendectomy. There is no sign of free air. There 
is no ascites. There is no sign of a bowel obstruction.

 

Lumbar vertebra have normal spacing and alignment. Bony pelvis is intact.

IMPRESSION: 

WALL THICKENING AND INFLAMMATORY CHANGES AROUND THE DESCENDING COLON RELATED TO DIVERTICULITIS. NO DR HIGUERA FLUID COLLECTION.

## 2019-09-12 ENCOUNTER — HOSPITAL ENCOUNTER (OUTPATIENT)
Dept: HOSPITAL 47 - RADECHMAIN | Age: 44
Discharge: HOME | End: 2019-09-12
Attending: FAMILY MEDICINE
Payer: MEDICAID

## 2019-09-12 DIAGNOSIS — I08.2: Primary | ICD-10-CM

## 2019-09-12 PROCEDURE — 93306 TTE W/DOPPLER COMPLETE: CPT

## 2019-09-13 NOTE — ECHOF
Referral Reason:R01.1 heart murmur



MEASUREMENTS

--------

HEIGHT: 180.3 cm

WEIGHT: 91.6 kg

BP: 107/62

RVIDd:   2.9 cm     (< 3.3)

IVSd:   1.0 cm     (0.6 - 1.1)

LVIDd:   4.4 cm     (3.9 - 5.3)

LVPWd:   1.1 cm     (0.6 - 1.1)

IVSs:   1.6 cm

LVIDs:   3.1 cm

LVPWs:   1.5 cm

LA Diam:   2.9 cm     (2.7 - 3.8)

LAESV Index (A-L):   24.45 ml/m

Ao Diam:   3.6 cm     (2.0 - 3.7)

AV Cusp:   2.0 cm     (1.5 - 2.6)

MV EXCURSION:   19.848 mm     (> 18.000)

MV EF SLOPE:   122 mm/s     (70 - 150)

EPSS:   0.5 cm

MV E Lior:   0.75 m/s

MV DecT:   307 ms

MV A Lior:   0.64 m/s

MV E/A Ratio:   1.17 

AR PHT:   1052 ms

RAP:   5.00 mmHg

RVSP:   22.92 mmHg







FINDINGS

--------

Sinus rhythm.

This was a technically good study.

The left ventricular size is normal.   There is borderline concentric left ventricular hypertrophy.  
 Overall left ventricular systolic function is normal with, an EF between 60 - 65 %.

The right ventricle is normal in size.

Normal LA  size by volume 22+/-6 ml/m2.

The right atrium is normal in size.

Interatrial and interventricular septum intact.

The aortic valve is trileaflet and appears structurally normal.   Trace to mild aortic regurgitation.


There is trace mitral regurgitation.

Mild tricuspid regurgitation present.   Right ventricular systolic pressure is normal at < 35 mmHg.

Trace/mild (physiologic)  pulmonic regurgitation.

The aortic root size is normal.

Normal inferior vena cava with normal inspiratory collapse consistent with estimated right atrial pre
ssure of  5 mmHg.   The inferior vena cava is mildly dilated.

There is no pericardial effusion.



CONCLUSIONS

--------

1. Sinus rhythm.

2. This was a technically good study.

3. The left ventricular size is normal.

4. There is borderline concentric left ventricular hypertrophy.

5. Overall left ventricular systolic function is normal with, an EF between 60 - 65 %.

6. The right ventricle is normal in size.

7. Normal LA size by volume 22+/-6 ml/m2.

8. The right atrium is normal in size.

9. Interatrial and interventricular septum intact.

10. The aortic valve is trileaflet and appears structurally normal.

11. Trace to mild aortic regurgitation.

12. There is trace mitral regurgitation.

13. Mild tricuspid regurgitation present.

14. Right ventricular systolic pressure is normal at < 35 mmHg.

15. Trace/mild (physiologic)  pulmonic regurgitation.

16. The aortic root size is normal.

17. Normal inferior vena cava with normal inspiratory collapse consistent with estimated right atrial
 pressure of  5 mmHg.

18. The inferior vena cava is mildly dilated.

19. There is no pericardial effusion.





SONOGRAPHER: Felicia Christie RDCS

## 2019-09-17 ENCOUNTER — HOSPITAL ENCOUNTER (OUTPATIENT)
Dept: HOSPITAL 47 - RADUSWWP | Age: 44
Discharge: HOME | End: 2019-09-17
Attending: FAMILY MEDICINE
Payer: MEDICAID

## 2019-09-17 DIAGNOSIS — D25.9: ICD-10-CM

## 2019-09-17 DIAGNOSIS — Z01.411: Primary | ICD-10-CM

## 2019-09-17 PROCEDURE — 76830 TRANSVAGINAL US NON-OB: CPT

## 2019-09-17 PROCEDURE — 76856 US EXAM PELVIC COMPLETE: CPT

## 2019-09-17 NOTE — US
EXAMINATION TYPE: US pelvis complete transvag

 

DATE OF EXAM: 9/17/2019

 

COMPARISON: Pelvic ultrasound September 28, 2018. CT abdomen and pelvis July 16, 2019.

 

CLINICAL HISTORY: Z01.411 Gynocological examination with abnormal fi. follow up fibroid per patient

 

TECHNIQUE:  Transvaginal (TV) and Transabdominal (TA) .  Transabdominal sonographic images of the pel
vis were acquired.  Transvaginal sonographic images were medically necessary to better assess the fol
lowing anatomy: Ovaries, endometrium

 

Date of LMP:  Menopause, G0

 

EXAM MEASUREMENTS:

 

Uterus:  6.1 x 3.9 x 2.3 cm

Endometrial Stripe: 0.2 cm

Right Ovary:  2.2 x 1.0 x 1.3 cm

Left Ovary:  2.1 x 1.2 x 1.1 cm

 

 

 

1. Uterus:  Anteverted   Fibroid visualized in left uterus = 2.7 nx 2.4 x 2.2 cm 

2. Endometrium:  wnl

3. Right Ovary:  wnl

4. Left Ovary:  wnl

5. Bilateral Adnexa:  wnl

6. Posterior cul-de-sac:  no free fluid

 

Heterogeneous anteverted uterus with redemonstration of heterogeneous lobulated poorly defined isoech
oic lesion measuring 2.7 cm could reflect intrauterine fibroid felt intramural in location. No signif
icant change from prior. No free fluid in pelvic cul-de-sac. Fibroids better visualized and transvagi
nal pelvic ultrasound which was performed today

 

IMPRESSION: . Confirmation of 2.7 cm intrauterine fibroid seen better on transvaginal pelvic ultrasou
nd less well seen on CT and transabdominal pelvic ultrasound study.

## 2019-09-30 ENCOUNTER — HOSPITAL ENCOUNTER (OUTPATIENT)
Dept: HOSPITAL 47 - RADMAMWWP | Age: 44
Discharge: HOME | End: 2019-09-30
Attending: FAMILY MEDICINE
Payer: MEDICAID

## 2019-09-30 DIAGNOSIS — Z12.31: Primary | ICD-10-CM

## 2019-09-30 PROCEDURE — 77067 SCR MAMMO BI INCL CAD: CPT

## 2019-09-30 PROCEDURE — 77063 BREAST TOMOSYNTHESIS BI: CPT

## 2019-10-01 NOTE — MM
Reason for exam: screening  (asymptomatic).

Last mammogram was performed 1 year ago.



History:

Patient is postmenopausal.



Physical Findings:

A clinical breast exam by your physician is recommended on an annual basis and 

results should be correlated with mammographic findings.



MG 3D Screening Mammo W/Cad

Bilateral CC and MLO view(s) were taken.

Prior study comparison: September 28, 2018, bilateral MG screening mammo w CAD.

The breast tissue is extremely dense which could obscure a lesion on mammography. 

There is no discrete abnormality.  No significant changes when compared with 

prior studies.





ASSESSMENT: Benign, BI-RAD 2



RECOMMENDATION:

Routine screening mammogram of both breasts in 1 year.

## 2019-10-30 ENCOUNTER — HOSPITAL ENCOUNTER (OUTPATIENT)
Dept: HOSPITAL 47 - ORWHC2ENDO | Age: 44
Discharge: HOME | End: 2019-10-30
Attending: INTERNAL MEDICINE
Payer: MEDICAID

## 2019-10-30 VITALS — BODY MASS INDEX: 26.4 KG/M2

## 2019-10-30 VITALS — RESPIRATION RATE: 16 BRPM | TEMPERATURE: 98.5 F

## 2019-10-30 VITALS — SYSTOLIC BLOOD PRESSURE: 106 MMHG | HEART RATE: 57 BPM | DIASTOLIC BLOOD PRESSURE: 68 MMHG

## 2019-10-30 DIAGNOSIS — K57.30: Primary | ICD-10-CM

## 2019-10-30 DIAGNOSIS — F17.200: ICD-10-CM

## 2019-10-30 DIAGNOSIS — Z90.49: ICD-10-CM

## 2019-10-30 PROCEDURE — 45378 DIAGNOSTIC COLONOSCOPY: CPT

## 2019-10-30 NOTE — P.PCN
Date of Procedure: 10/30/19


Procedure(s) Performed: 


BRIEF HISTORY: Patient is a 43-year-old pleasant white female scheduled for an 

elective colonoscopy as a part of evaluation of lower abdominal pain and change 

in bowel habits for the last 2 years duration.  She had prior history of acute 

diverticulitis in January 2018.





PROCEDURE PERFORMED: Colonoscopy. 





PREOPERATIVE DIAGNOSIS: Lower abdominal pain and change in bowel habits. 





IV sedation per Anesthesia. 





PROCEDURE: After informed consent was obtained, the patient, was brought into 

the endoscopy unit. IV sedation was administered by Anesthesia under continuous 

monitoring.  Digital rectal examination was normal. Initially the Olympus CF-160

flexible video colonoscope was then inserted in the rectum, gradually advanced 

into the cecum without any difficulty. Careful examination was performed as the 

scope was gradually being withdrawn. Ileocecal valve and the appendiceal orifice

were visualized and appeared normal.  Prep was excellent. Mucosa of the cecum, 

ascending colon, transverse colon, descending colon, sigmoid colon, and rectum 

appeared normal.  Moderate sigmoid diverticulosis seen.  Retroflexion was 

performed in the rectum and no lesions were seen. The patient tolerated the 

procedure well. 





IMPRESSION: 


Moderate sigmoid diverticulosis


No evidence of colorectal neoplasia





RECOMMENDATIONS:  Findings of this examination were discussed with the patient 

as well as a family.  She was advised to be a high-fiber diet and take fiber 

supplements a regular basis.  She can have a repeat screening colonoscopy in 10 

years.